# Patient Record
Sex: FEMALE | Race: WHITE | NOT HISPANIC OR LATINO
[De-identification: names, ages, dates, MRNs, and addresses within clinical notes are randomized per-mention and may not be internally consistent; named-entity substitution may affect disease eponyms.]

---

## 2017-01-13 ENCOUNTER — OTHER (OUTPATIENT)
Age: 67
End: 2017-01-13

## 2017-05-16 ENCOUNTER — OTHER (OUTPATIENT)
Age: 67
End: 2017-05-16

## 2017-06-05 ENCOUNTER — FORM ENCOUNTER (OUTPATIENT)
Age: 67
End: 2017-06-05

## 2017-06-06 ENCOUNTER — OUTPATIENT (OUTPATIENT)
Dept: OUTPATIENT SERVICES | Facility: HOSPITAL | Age: 67
LOS: 1 days | End: 2017-06-06
Payer: MEDICARE

## 2017-06-06 ENCOUNTER — APPOINTMENT (OUTPATIENT)
Dept: MRI IMAGING | Facility: CLINIC | Age: 67
End: 2017-06-06

## 2017-06-06 DIAGNOSIS — Z85.3 PERSONAL HISTORY OF MALIGNANT NEOPLASM OF BREAST: ICD-10-CM

## 2017-06-06 PROCEDURE — C8937: CPT

## 2017-06-06 PROCEDURE — C8908: CPT

## 2017-06-06 PROCEDURE — A9585: CPT

## 2017-11-13 ENCOUNTER — OTHER (OUTPATIENT)
Age: 67
End: 2017-11-13

## 2018-02-07 ENCOUNTER — APPOINTMENT (OUTPATIENT)
Dept: BREAST CENTER | Facility: CLINIC | Age: 68
End: 2018-02-07
Payer: MEDICARE

## 2018-02-07 VITALS
DIASTOLIC BLOOD PRESSURE: 78 MMHG | WEIGHT: 165 LBS | BODY MASS INDEX: 24.44 KG/M2 | HEART RATE: 74 BPM | SYSTOLIC BLOOD PRESSURE: 124 MMHG | HEIGHT: 69 IN

## 2018-02-07 PROCEDURE — 99213 OFFICE O/P EST LOW 20 MIN: CPT

## 2018-06-17 ENCOUNTER — FORM ENCOUNTER (OUTPATIENT)
Age: 68
End: 2018-06-17

## 2018-06-18 ENCOUNTER — APPOINTMENT (OUTPATIENT)
Dept: MRI IMAGING | Facility: CLINIC | Age: 68
End: 2018-06-18
Payer: MEDICARE

## 2018-06-18 ENCOUNTER — OUTPATIENT (OUTPATIENT)
Dept: OUTPATIENT SERVICES | Facility: HOSPITAL | Age: 68
LOS: 1 days | End: 2018-06-18
Payer: MEDICARE

## 2018-06-18 DIAGNOSIS — Z85.3 PERSONAL HISTORY OF MALIGNANT NEOPLASM OF BREAST: ICD-10-CM

## 2018-06-18 PROCEDURE — 0159T: CPT | Mod: 26

## 2018-06-18 PROCEDURE — 77059 MRI BREAST BILATERAL: CPT | Mod: 26

## 2018-06-18 PROCEDURE — 82565 ASSAY OF CREATININE: CPT

## 2018-06-18 PROCEDURE — A9585: CPT

## 2018-06-18 PROCEDURE — C8937: CPT

## 2018-06-18 PROCEDURE — C8908: CPT

## 2019-06-25 ENCOUNTER — OTHER (OUTPATIENT)
Age: 69
End: 2019-06-25

## 2019-07-17 ENCOUNTER — FORM ENCOUNTER (OUTPATIENT)
Age: 69
End: 2019-07-17

## 2019-07-18 ENCOUNTER — OUTPATIENT (OUTPATIENT)
Dept: OUTPATIENT SERVICES | Facility: HOSPITAL | Age: 69
LOS: 1 days | End: 2019-07-18
Payer: MEDICARE

## 2019-07-18 ENCOUNTER — APPOINTMENT (OUTPATIENT)
Dept: MRI IMAGING | Facility: CLINIC | Age: 69
End: 2019-07-18
Payer: MEDICARE

## 2019-07-18 DIAGNOSIS — Z85.3 PERSONAL HISTORY OF MALIGNANT NEOPLASM OF BREAST: ICD-10-CM

## 2019-07-18 DIAGNOSIS — Z00.8 ENCOUNTER FOR OTHER GENERAL EXAMINATION: ICD-10-CM

## 2019-07-18 PROCEDURE — 77049 MRI BREAST C-+ W/CAD BI: CPT | Mod: 26

## 2019-07-18 PROCEDURE — C8908: CPT

## 2019-07-18 PROCEDURE — C8937: CPT

## 2019-07-18 PROCEDURE — A9585: CPT

## 2020-01-09 ENCOUNTER — CHART COPY (OUTPATIENT)
Age: 70
End: 2020-01-09

## 2020-04-28 ENCOUNTER — APPOINTMENT (OUTPATIENT)
Dept: BREAST CENTER | Facility: CLINIC | Age: 70
End: 2020-04-28

## 2020-07-29 ENCOUNTER — OUTPATIENT (OUTPATIENT)
Dept: OUTPATIENT SERVICES | Facility: HOSPITAL | Age: 70
LOS: 1 days | End: 2020-07-29
Payer: MEDICARE

## 2020-07-29 ENCOUNTER — APPOINTMENT (OUTPATIENT)
Dept: MRI IMAGING | Facility: CLINIC | Age: 70
End: 2020-07-29
Payer: MEDICARE

## 2020-07-29 ENCOUNTER — RESULT REVIEW (OUTPATIENT)
Age: 70
End: 2020-07-29

## 2020-07-29 DIAGNOSIS — Z00.00 ENCOUNTER FOR GENERAL ADULT MEDICAL EXAMINATION WITHOUT ABNORMAL FINDINGS: ICD-10-CM

## 2020-07-29 DIAGNOSIS — Z85.3 PERSONAL HISTORY OF MALIGNANT NEOPLASM OF BREAST: ICD-10-CM

## 2020-07-29 PROCEDURE — A9585: CPT

## 2020-07-29 PROCEDURE — C8937: CPT

## 2020-07-29 PROCEDURE — C8908: CPT

## 2020-07-29 PROCEDURE — 77049 MRI BREAST C-+ W/CAD BI: CPT | Mod: 26

## 2020-09-01 ENCOUNTER — APPOINTMENT (OUTPATIENT)
Dept: BREAST CENTER | Facility: CLINIC | Age: 70
End: 2020-09-01
Payer: MEDICARE

## 2020-09-01 VITALS
HEART RATE: 70 BPM | BODY MASS INDEX: 24.44 KG/M2 | DIASTOLIC BLOOD PRESSURE: 78 MMHG | WEIGHT: 165 LBS | HEIGHT: 69 IN | SYSTOLIC BLOOD PRESSURE: 125 MMHG

## 2020-09-01 PROCEDURE — 99213 OFFICE O/P EST LOW 20 MIN: CPT

## 2020-09-01 NOTE — HISTORY OF PRESENT ILLNESS
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 This was detected on a routine breast MRI performed for elevated risk with a previous history of atypical lobular hyperplasia. She was treated with a lumpectomy and radiation therapy. The DCIS was ER negative so she did not receive tamoxifen. She had been on Evista for risk reduction previously. \par \par She has no current complaints.

## 2020-09-01 NOTE — DATA REVIEWED
[FreeTextEntry1] : Bilateral mammogram with nedra 2/21/2020:  Negative.\par Bilateral breast sonogram 2/21/2020:  Negative.\par \par \par Bilateral breast MRI  7/29/2020: No MRI evidence of malignancy.

## 2020-09-01 NOTE — PHYSICAL EXAM
[Normocephalic] : normocephalic [Sclera nonicteric] : sclera nonicteric [Supple] : supple [No Supraclavicular Adenopathy] : no supraclavicular adenopathy [No Cervical Adenopathy] : no cervical adenopathy [No Thyromegaly] : no thyromegaly [Clear to Auscultation Bilat] : clear to auscultation bilaterally [Examined in the supine and seated position] : examined in the supine and seated position [No dominant masses] : no dominant masses in right breast  [No dominant masses] : no dominant masses left breast [No Nipple Retraction] : no left nipple retraction [No Nipple Discharge] : no left nipple discharge [No Axillary Lymphadenopathy] : no left axillary lymphadenopathy [Soft] : abdomen soft [Not Tender] : non-tender [EOMI] : extra ocular movement intact [de-identified] : Circumareolar scar 12:00. [de-identified] : Circumareolar scar 7-9:00.

## 2020-09-01 NOTE — PAST MEDICAL HISTORY
[Menarche Age ____] : age at menarche was [unfilled] [Menopause Age____] : age at menopause was [unfilled] [Total Preg ___] : G[unfilled] [Live Births ___] : P[unfilled]  [Age At Live Birth ___] : Age at live birth: [unfilled] [FreeTextEntry2] : son, daughter [FreeTextEntry7] : no

## 2021-09-21 ENCOUNTER — OUTPATIENT (OUTPATIENT)
Dept: OUTPATIENT SERVICES | Facility: HOSPITAL | Age: 71
LOS: 1 days | End: 2021-09-21
Payer: MEDICARE

## 2021-09-21 ENCOUNTER — APPOINTMENT (OUTPATIENT)
Dept: MRI IMAGING | Facility: CLINIC | Age: 71
End: 2021-09-21
Payer: MEDICARE

## 2021-09-21 DIAGNOSIS — Z85.3 PERSONAL HISTORY OF MALIGNANT NEOPLASM OF BREAST: ICD-10-CM

## 2021-09-21 PROCEDURE — C8937: CPT

## 2021-09-21 PROCEDURE — C8908: CPT

## 2021-09-21 PROCEDURE — A9585: CPT

## 2021-09-21 PROCEDURE — 77049 MRI BREAST C-+ W/CAD BI: CPT | Mod: 26

## 2021-09-28 ENCOUNTER — NON-APPOINTMENT (OUTPATIENT)
Age: 71
End: 2021-09-28

## 2022-10-03 ENCOUNTER — OUTPATIENT (OUTPATIENT)
Dept: OUTPATIENT SERVICES | Facility: HOSPITAL | Age: 72
LOS: 1 days | End: 2022-10-03
Payer: MEDICARE

## 2022-10-03 ENCOUNTER — APPOINTMENT (OUTPATIENT)
Dept: MRI IMAGING | Facility: CLINIC | Age: 72
End: 2022-10-03

## 2022-10-03 DIAGNOSIS — Z85.3 PERSONAL HISTORY OF MALIGNANT NEOPLASM OF BREAST: ICD-10-CM

## 2022-10-03 PROCEDURE — C8908: CPT

## 2022-10-03 PROCEDURE — 77049 MRI BREAST C-+ W/CAD BI: CPT | Mod: 26

## 2022-10-03 PROCEDURE — C8937: CPT

## 2022-10-03 PROCEDURE — A9585: CPT

## 2022-11-22 ENCOUNTER — APPOINTMENT (OUTPATIENT)
Dept: BREAST CENTER | Facility: CLINIC | Age: 72
End: 2022-11-22

## 2022-11-22 VITALS
BODY MASS INDEX: 26.66 KG/M2 | WEIGHT: 180 LBS | HEART RATE: 105 BPM | HEIGHT: 69 IN | SYSTOLIC BLOOD PRESSURE: 133 MMHG | DIASTOLIC BLOOD PRESSURE: 85 MMHG

## 2022-11-22 PROCEDURE — 99213 OFFICE O/P EST LOW 20 MIN: CPT

## 2022-11-22 RX ORDER — METOPROLOL SUCCINATE 50 MG/1
50 TABLET, EXTENDED RELEASE ORAL
Qty: 90 | Refills: 0 | Status: ACTIVE | COMMUNITY
Start: 2022-08-30

## 2022-11-22 RX ORDER — APIXABAN 5 MG/1
5 TABLET, FILM COATED ORAL
Qty: 180 | Refills: 0 | Status: ACTIVE | COMMUNITY
Start: 2022-06-13

## 2022-11-22 NOTE — PHYSICAL EXAM
[Normocephalic] : normocephalic [EOMI] : extra ocular movement intact [Sclera nonicteric] : sclera nonicteric [Supple] : supple [No Supraclavicular Adenopathy] : no supraclavicular adenopathy [No Cervical Adenopathy] : no cervical adenopathy [No Thyromegaly] : no thyromegaly [Clear to Auscultation Bilat] : clear to auscultation bilaterally [Examined in the supine and seated position] : examined in the supine and seated position [No dominant masses] : no dominant masses in right breast  [No dominant masses] : no dominant masses left breast [No Nipple Retraction] : no left nipple retraction [No Nipple Discharge] : no left nipple discharge [No Axillary Lymphadenopathy] : no left axillary lymphadenopathy [Soft] : abdomen soft [Not Tender] : non-tender [de-identified] : Circumareolar scar 12:00. [de-identified] : Circumareolar scar 7-9:00.

## 2022-11-22 NOTE — DATA REVIEWED
[FreeTextEntry1] : Bilateral mammogram  and ultrasound ([facility])  5/26/2022:  No evidence of malignancy.\par \par Bilateral breast MRI  10/3/2022: No MRI evidence of malignancy.

## 2022-11-22 NOTE — HISTORY OF PRESENT ILLNESS
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 This was detected on a routine breast MRI performed for elevated risk with a previous history of atypical lobular hyperplasia. She was treated with a lumpectomy and radiation therapy. The DCIS was ER negative so she did not receive tamoxifen. She had been on Evista for risk reduction previously. \par \par She has no current complaints.

## 2022-11-22 NOTE — CONSULT LETTER
[Courtesy Letter:] : I had the pleasure of seeing your patient, [unfilled], in my office today. [Sincerely,] : Sincerely, [Dear  ___] : Dear  [unfilled],

## 2023-11-15 ENCOUNTER — OUTPATIENT (OUTPATIENT)
Dept: OUTPATIENT SERVICES | Facility: HOSPITAL | Age: 73
LOS: 1 days | End: 2023-11-15
Payer: MEDICARE

## 2023-11-15 ENCOUNTER — APPOINTMENT (OUTPATIENT)
Dept: MRI IMAGING | Facility: CLINIC | Age: 73
End: 2023-11-15
Payer: MEDICARE

## 2023-11-15 DIAGNOSIS — Z85.3 PERSONAL HISTORY OF MALIGNANT NEOPLASM OF BREAST: ICD-10-CM

## 2023-11-15 PROCEDURE — C8908: CPT

## 2023-11-15 PROCEDURE — C8937: CPT

## 2023-11-15 PROCEDURE — A9585: CPT

## 2023-11-15 PROCEDURE — 77049 MRI BREAST C-+ W/CAD BI: CPT | Mod: 26

## 2023-11-17 ENCOUNTER — NON-APPOINTMENT (OUTPATIENT)
Age: 73
End: 2023-11-17

## 2024-02-27 ENCOUNTER — APPOINTMENT (OUTPATIENT)
Dept: BREAST CENTER | Facility: CLINIC | Age: 74
End: 2024-02-27
Payer: MEDICARE

## 2024-02-27 VITALS
HEART RATE: 108 BPM | HEIGHT: 69 IN | DIASTOLIC BLOOD PRESSURE: 75 MMHG | BODY MASS INDEX: 26.66 KG/M2 | WEIGHT: 180 LBS | SYSTOLIC BLOOD PRESSURE: 126 MMHG

## 2024-02-27 DIAGNOSIS — Z85.3 PERSONAL HISTORY OF MALIGNANT NEOPLASM OF BREAST: ICD-10-CM

## 2024-02-27 DIAGNOSIS — Z08 ENCOUNTER FOR FOLLOW-UP EXAMINATION AFTER COMPLETED TREATMENT FOR MALIGNANT NEOPLASM: ICD-10-CM

## 2024-02-27 DIAGNOSIS — Z85.3 ENCOUNTER FOR FOLLOW-UP EXAMINATION AFTER COMPLETED TREATMENT FOR MALIGNANT NEOPLASM: ICD-10-CM

## 2024-02-27 PROCEDURE — 99213 OFFICE O/P EST LOW 20 MIN: CPT

## 2024-02-27 NOTE — PHYSICAL EXAM
[Normocephalic] : normocephalic [EOMI] : extra ocular movement intact [Sclera nonicteric] : sclera nonicteric [Supple] : supple [No Supraclavicular Adenopathy] : no supraclavicular adenopathy [No Cervical Adenopathy] : no cervical adenopathy [No Thyromegaly] : no thyromegaly [Clear to Auscultation Bilat] : clear to auscultation bilaterally [Examined in the supine and seated position] : examined in the supine and seated position [No dominant masses] : no dominant masses in right breast  [No dominant masses] : no dominant masses left breast [No Nipple Retraction] : no left nipple retraction [No Nipple Discharge] : no left nipple discharge [No Axillary Lymphadenopathy] : no left axillary lymphadenopathy [Soft] : abdomen soft [Not Tender] : non-tender [de-identified] : Circumareolar scar 12:00. [de-identified] : Circumareolar scar 7-9:00.

## 2024-02-27 NOTE — DATA REVIEWED
[FreeTextEntry1] : Bilateral mammogram  and ultrasound ([facility])  6/1/2023:  No evidence of malignancy.  Bilateral breast MRI  11/15/2023: No MRI evidence of malignancy.

## 2024-02-27 NOTE — HISTORY OF PRESENT ILLNESS
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 This was detected on a routine breast MRI performed for elevated risk with a previous history of atypical lobular hyperplasia. She was treated with a lumpectomy and radiation therapy. The DCIS was ER negative so she did not receive tamoxifen. She had been on Evista for risk reduction previously.   She has no current complaints.

## 2025-03-19 ENCOUNTER — APPOINTMENT (OUTPATIENT)
Dept: BREAST CENTER | Facility: CLINIC | Age: 75
End: 2025-03-19
Payer: MEDICARE

## 2025-03-19 ENCOUNTER — NON-APPOINTMENT (OUTPATIENT)
Age: 75
End: 2025-03-19

## 2025-03-19 VITALS
HEART RATE: 62 BPM | WEIGHT: 182 LBS | DIASTOLIC BLOOD PRESSURE: 67 MMHG | HEIGHT: 69 IN | SYSTOLIC BLOOD PRESSURE: 116 MMHG | BODY MASS INDEX: 26.96 KG/M2

## 2025-03-19 DIAGNOSIS — Z85.3 PERSONAL HISTORY OF MALIGNANT NEOPLASM OF BREAST: ICD-10-CM

## 2025-03-19 PROCEDURE — 99213 OFFICE O/P EST LOW 20 MIN: CPT

## 2025-04-28 ENCOUNTER — OUTPATIENT (OUTPATIENT)
Dept: OUTPATIENT SERVICES | Facility: HOSPITAL | Age: 75
LOS: 1 days | End: 2025-04-28
Payer: MEDICARE

## 2025-04-28 ENCOUNTER — APPOINTMENT (OUTPATIENT)
Dept: MRI IMAGING | Facility: CLINIC | Age: 75
End: 2025-04-28
Payer: MEDICARE

## 2025-04-28 DIAGNOSIS — Z85.3 PERSONAL HISTORY OF MALIGNANT NEOPLASM OF BREAST: ICD-10-CM

## 2025-04-28 PROCEDURE — C8908: CPT

## 2025-04-28 PROCEDURE — 77049 MRI BREAST C-+ W/CAD BI: CPT | Mod: 26

## 2025-04-28 PROCEDURE — C8937: CPT

## 2025-04-28 PROCEDURE — A9585: CPT
